# Patient Record
Sex: FEMALE | Race: OTHER | Employment: OTHER | URBAN - METROPOLITAN AREA
[De-identification: names, ages, dates, MRNs, and addresses within clinical notes are randomized per-mention and may not be internally consistent; named-entity substitution may affect disease eponyms.]

---

## 2023-08-23 ENCOUNTER — NEW PATIENT (OUTPATIENT)
Dept: URBAN - METROPOLITAN AREA CLINIC 47 | Facility: CLINIC | Age: 68
End: 2023-08-23

## 2023-08-23 DIAGNOSIS — D31.32: ICD-10-CM

## 2023-08-23 DIAGNOSIS — H25.813: ICD-10-CM

## 2023-08-23 DIAGNOSIS — H52.4: ICD-10-CM

## 2023-08-23 DIAGNOSIS — H52.13: ICD-10-CM

## 2023-08-23 PROCEDURE — 92015 DETERMINE REFRACTIVE STATE: CPT

## 2023-08-23 PROCEDURE — 92004 COMPRE OPH EXAM NEW PT 1/>: CPT

## 2023-08-23 PROCEDURE — 92310-2 LEVEL 2 CONTACT LENS MANAGEMENT

## 2023-08-23 PROCEDURE — 92250 FUNDUS PHOTOGRAPHY W/I&R: CPT

## 2023-08-23 ASSESSMENT — VISUAL ACUITY
OD_SC: 20/400
OS_CC: J3 CL
OD_CC: 20/30
OU_CC: J2 CL
OS_SC: CF 6FT
OU_CC: 20/30-2 CL
OS_CC: 20/60 CL
OU_SC: 20/400

## 2023-08-23 ASSESSMENT — TONOMETRY
OS_IOP_MMHG: 13
OD_IOP_MMHG: 13

## 2023-11-30 ENCOUNTER — APPOINTMENT (RX ONLY)
Dept: RURAL CLINIC 4 | Facility: CLINIC | Age: 68
Setting detail: DERMATOLOGY
End: 2023-11-30

## 2023-11-30 DIAGNOSIS — L82.1 OTHER SEBORRHEIC KERATOSIS: ICD-10-CM

## 2023-11-30 DIAGNOSIS — D22 MELANOCYTIC NEVI: ICD-10-CM

## 2023-11-30 DIAGNOSIS — L85.3 XEROSIS CUTIS: ICD-10-CM

## 2023-11-30 DIAGNOSIS — L57.8 OTHER SKIN CHANGES DUE TO CHRONIC EXPOSURE TO NONIONIZING RADIATION: ICD-10-CM

## 2023-11-30 DIAGNOSIS — D18.0 HEMANGIOMA: ICD-10-CM

## 2023-11-30 DIAGNOSIS — L81.4 OTHER MELANIN HYPERPIGMENTATION: ICD-10-CM

## 2023-11-30 PROBLEM — D18.01 HEMANGIOMA OF SKIN AND SUBCUTANEOUS TISSUE: Status: ACTIVE | Noted: 2023-11-30

## 2023-11-30 PROBLEM — D22.5 MELANOCYTIC NEVI OF TRUNK: Status: ACTIVE | Noted: 2023-11-30

## 2023-11-30 PROCEDURE — ? COUNSELING

## 2023-11-30 PROCEDURE — ? PRESCRIPTION MEDICATION MANAGEMENT

## 2023-11-30 PROCEDURE — ? PATIENT SPECIFIC COUNSELING

## 2023-11-30 PROCEDURE — ? PRESCRIPTION

## 2023-11-30 PROCEDURE — 99204 OFFICE O/P NEW MOD 45 MIN: CPT

## 2023-11-30 PROCEDURE — ? TREATMENT REGIMEN

## 2023-11-30 RX ORDER — TRETIONIN 0.25 MG/G
APPLY CREAM TOPICAL QHS
Qty: 20 | Refills: 6 | Status: ERX | COMMUNITY
Start: 2023-11-30

## 2023-11-30 RX ORDER — AMMONIUM LACTATE 12 G/100G
APPLY LOTION TOPICAL QD
Qty: 400 | Refills: 5 | Status: ERX | COMMUNITY
Start: 2023-11-30

## 2023-11-30 RX ADMIN — AMMONIUM LACTATE APPLY: 12 LOTION TOPICAL at 00:00

## 2023-11-30 RX ADMIN — TRETIONIN APPLY: 0.25 CREAM TOPICAL at 00:00

## 2023-11-30 ASSESSMENT — LOCATION ZONE DERM
LOCATION ZONE: TRUNK
LOCATION ZONE: FACE

## 2023-11-30 ASSESSMENT — LOCATION SIMPLE DESCRIPTION DERM
LOCATION SIMPLE: ABDOMEN
LOCATION SIMPLE: LEFT UPPER BACK
LOCATION SIMPLE: RIGHT UPPER BACK
LOCATION SIMPLE: LEFT CHEEK
LOCATION SIMPLE: CHEST
LOCATION SIMPLE: RIGHT LOWER BACK

## 2023-11-30 ASSESSMENT — LOCATION DETAILED DESCRIPTION DERM
LOCATION DETAILED: RIGHT INFERIOR LATERAL LOWER BACK
LOCATION DETAILED: RIGHT SUPERIOR UPPER BACK
LOCATION DETAILED: LEFT MID-UPPER BACK
LOCATION DETAILED: LEFT MEDIAL SUPERIOR CHEST
LOCATION DETAILED: LEFT INFERIOR UPPER BACK
LOCATION DETAILED: LEFT LATERAL ABDOMEN
LOCATION DETAILED: LEFT CENTRAL MALAR CHEEK

## 2023-11-30 NOTE — PROCEDURE: PRESCRIPTION MEDICATION MANAGEMENT
Detail Level: Detailed
Initiate Treatment: Tretinoin 0.025% 2-3 times per week.
Render In Strict Bullet Format?: No
Initiate Treatment: ammonium lactate 12 % lotion Qd\\nQuantity: 400.0 g  Days Supply: 30\\nSig: Apply thin layer to dry skin on body daily after shower. Avoid using on face.

## 2024-08-26 ENCOUNTER — COMPREHENSIVE EXAM (OUTPATIENT)
Dept: URBAN - METROPOLITAN AREA CLINIC 47 | Facility: CLINIC | Age: 69
End: 2024-08-26

## 2024-08-26 DIAGNOSIS — H52.4: ICD-10-CM

## 2024-08-26 DIAGNOSIS — H52.13: ICD-10-CM

## 2024-08-26 DIAGNOSIS — D31.32: ICD-10-CM

## 2024-08-26 DIAGNOSIS — H25.813: ICD-10-CM

## 2024-08-26 PROCEDURE — 92014 COMPRE OPH EXAM EST PT 1/>: CPT

## 2024-08-26 PROCEDURE — 92015 DETERMINE REFRACTIVE STATE: CPT

## 2024-08-26 ASSESSMENT — TONOMETRY
OS_IOP_MMHG: 14
OD_IOP_MMHG: 13

## 2024-08-26 ASSESSMENT — VISUAL ACUITY
OS_CC: J4 CL
OD_CC: J3 CL
OD_CC: 20/50-1 CL

## 2025-07-14 ENCOUNTER — CONSULTATION/EVALUATION (OUTPATIENT)
Age: 70
End: 2025-07-14

## 2025-07-14 DIAGNOSIS — D31.32: ICD-10-CM

## 2025-07-14 DIAGNOSIS — H25.813: ICD-10-CM

## 2025-07-14 DIAGNOSIS — H04.123: ICD-10-CM

## 2025-07-14 PROCEDURE — A4262 TEMPORARY TEAR DUCT PLUG: HCPCS

## 2025-07-14 PROCEDURE — 68761L LACRIFILL: Mod: E1,E3

## 2025-07-14 PROCEDURE — 99214 OFFICE O/P EST MOD 30 MIN: CPT | Mod: 25

## 2025-07-14 PROCEDURE — 92134 CPTRZ OPH DX IMG PST SGM RTA: CPT | Mod: NC

## 2025-07-14 PROCEDURE — 92136 OPHTHALMIC BIOMETRY: CPT

## 2025-07-14 PROCEDURE — 92025-2 CORNEAL TOPOGRAPHY, PT: Mod: NC

## 2025-07-14 RX ORDER — CYCLOSPORINE 0 MG/ML: 1 SOLUTION/ DROPS OPHTHALMIC; TOPICAL

## 2025-07-22 ENCOUNTER — FOLLOW UP (OUTPATIENT)
Age: 70
End: 2025-07-22

## 2025-07-22 DIAGNOSIS — H01.006: ICD-10-CM

## 2025-07-22 DIAGNOSIS — H04.123: ICD-10-CM

## 2025-07-22 DIAGNOSIS — H01.003: ICD-10-CM

## 2025-07-22 DIAGNOSIS — H02.883: ICD-10-CM

## 2025-07-22 DIAGNOSIS — H02.886: ICD-10-CM

## 2025-07-22 PROCEDURE — 99213 OFFICE O/P EST LOW 20 MIN: CPT

## 2025-07-22 RX ORDER — MOXIFLOXACIN OPHTHALMIC 5 MG/ML: 1 SOLUTION/ DROPS OPHTHALMIC

## 2025-07-22 RX ORDER — PREDNISOLONE ACETATE 10 MG/ML: 1 SUSPENSION/ DROPS OPHTHALMIC

## 2025-07-22 RX ORDER — KETOROLAC TROMETHAMINE 5 MG/ML: 1 SOLUTION OPHTHALMIC

## 2025-07-30 ENCOUNTER — SURGERY/PROCEDURE (OUTPATIENT)
Age: 70
End: 2025-07-30

## 2025-07-30 ENCOUNTER — PRE-OP/H&P (OUTPATIENT)
Age: 70
End: 2025-07-30

## 2025-07-30 DIAGNOSIS — H01.003: ICD-10-CM

## 2025-07-30 DIAGNOSIS — H04.123: ICD-10-CM

## 2025-07-30 DIAGNOSIS — H02.883: ICD-10-CM

## 2025-07-30 DIAGNOSIS — H01.006: ICD-10-CM

## 2025-07-30 DIAGNOSIS — H25.813: ICD-10-CM

## 2025-07-30 DIAGNOSIS — H02.886: ICD-10-CM

## 2025-07-30 PROCEDURE — 99199PAV PROF ADVANCED VISION PACKAGE

## 2025-07-30 PROCEDURE — 66999LNSR LENSAR LASER FOR CAT SX

## 2025-07-30 PROCEDURE — 99211HP H&P OFFICE/OUTPATIENT VISIT, EST

## 2025-07-30 PROCEDURE — 66984AV REMOVE CATARACT, INSERT ADVANCED LENS

## 2025-07-31 ENCOUNTER — SURGERY/PROCEDURE (OUTPATIENT)
Age: 70
End: 2025-07-31

## 2025-07-31 ENCOUNTER — PRE-OP/H&P (OUTPATIENT)
Age: 70
End: 2025-07-31

## 2025-07-31 DIAGNOSIS — Z96.1: ICD-10-CM

## 2025-07-31 DIAGNOSIS — H25.812: ICD-10-CM

## 2025-07-31 PROCEDURE — 66999LNSR LENSAR LASER FOR CAT SX

## 2025-07-31 PROCEDURE — 99211HP H&P OFFICE/OUTPATIENT VISIT, EST

## 2025-07-31 PROCEDURE — 66984 XCAPSL CTRC RMVL W/O ECP: CPT | Mod: 79,LT

## 2025-07-31 PROCEDURE — 99199PAV PROF ADVANCED VISION PACKAGE

## 2025-08-01 ENCOUNTER — POST-OP (OUTPATIENT)
Age: 70
End: 2025-08-01

## 2025-08-01 DIAGNOSIS — Z96.1: ICD-10-CM

## 2025-08-01 PROCEDURE — P6698455 NON-COMANAGED ADVANCED PO

## 2025-08-08 ENCOUNTER — POST-OP (OUTPATIENT)
Age: 70
End: 2025-08-08

## 2025-08-08 DIAGNOSIS — Z96.1: ICD-10-CM

## 2025-08-08 DIAGNOSIS — H04.123: ICD-10-CM

## 2025-08-08 DIAGNOSIS — H02.88B: ICD-10-CM

## 2025-08-08 DIAGNOSIS — H02.88A: ICD-10-CM

## 2025-08-08 DIAGNOSIS — H16.143: ICD-10-CM

## 2025-08-08 PROCEDURE — 99024 POSTOP FOLLOW-UP VISIT: CPT

## 2025-08-08 PROCEDURE — 99199RSP RESIDENT SUPERVISED BY PROVIDER

## 2025-08-18 ENCOUNTER — CLINIC PROCEDURE ONLY (OUTPATIENT)
Age: 70
End: 2025-08-18

## 2025-08-18 DIAGNOSIS — H04.123: ICD-10-CM

## 2025-08-18 PROCEDURE — 99199ST SERUM TEARS

## 2025-08-26 ENCOUNTER — POST-OP (OUTPATIENT)
Age: 70
End: 2025-08-26

## 2025-08-26 DIAGNOSIS — H04.123: ICD-10-CM

## 2025-08-26 DIAGNOSIS — H16.143: ICD-10-CM

## 2025-08-26 DIAGNOSIS — Z96.1: ICD-10-CM
